# Patient Record
Sex: FEMALE | Race: WHITE
[De-identification: names, ages, dates, MRNs, and addresses within clinical notes are randomized per-mention and may not be internally consistent; named-entity substitution may affect disease eponyms.]

---

## 2017-12-10 ENCOUNTER — HOSPITAL ENCOUNTER (EMERGENCY)
Dept: HOSPITAL 76 - ED | Age: 37
Discharge: HOME | End: 2017-12-10
Payer: COMMERCIAL

## 2017-12-10 VITALS — SYSTOLIC BLOOD PRESSURE: 100 MMHG | DIASTOLIC BLOOD PRESSURE: 72 MMHG

## 2017-12-10 DIAGNOSIS — M79.605: Primary | ICD-10-CM

## 2017-12-10 DIAGNOSIS — R03.0: ICD-10-CM

## 2017-12-10 PROCEDURE — 99282 EMERGENCY DEPT VISIT SF MDM: CPT

## 2018-04-20 ENCOUNTER — HOSPITAL ENCOUNTER (OUTPATIENT)
Dept: HOSPITAL 76 - LAB.WCP | Age: 38
Discharge: HOME | End: 2018-04-20
Attending: PHYSICIAN ASSISTANT
Payer: COMMERCIAL

## 2018-04-20 DIAGNOSIS — L03.319: Primary | ICD-10-CM

## 2018-04-20 PROCEDURE — 87205 SMEAR GRAM STAIN: CPT

## 2018-04-20 PROCEDURE — 87070 CULTURE OTHR SPECIMN AEROBIC: CPT

## 2019-01-07 ENCOUNTER — HOSPITAL ENCOUNTER (OUTPATIENT)
Age: 39
End: 2019-01-07
Payer: COMMERCIAL

## 2019-01-07 DIAGNOSIS — Z12.31: Primary | ICD-10-CM

## 2019-01-07 DIAGNOSIS — Z53.9: ICD-10-CM

## 2019-01-11 ENCOUNTER — HOSPITAL ENCOUNTER (OUTPATIENT)
Age: 39
End: 2019-01-11
Payer: COMMERCIAL

## 2019-01-11 DIAGNOSIS — R92.8: Primary | ICD-10-CM

## 2019-01-11 DIAGNOSIS — N64.89: ICD-10-CM

## 2019-01-11 PROCEDURE — 76642 ULTRASOUND BREAST LIMITED: CPT

## 2019-01-11 PROCEDURE — 77066 DX MAMMO INCL CAD BI: CPT

## 2019-01-11 NOTE — DI.US.S_ITS
LIMITED ULTRASOUND OF RIGHT BREAST: 1/11/2019  
CLINICAL: Palpable tender right breast lump.    
   
Comparison is made to exams dated:  1/11/2019 mammogram, 12/14/2017 ultrasound, and   
12/14/2017 mammogram - [facility].    
Real-time and Doppler ultrasound of the right breast 10 o'clock region were performed.    
Gray scale images of the real-time examination were reviewed.    
   
There is a 0.6 x 0.2 x 0.5 cm oval circumscribed hypoechoic probable complicated cyst in   
the right breast at 10:00 position 15 cm from the nipple at the site of the patient's   
reported palpable abnormality with low level internal echogenic foci, mildly increased   
posterior acoustic enhancement/increased through transmission, and no vascularity on   
Doppler ultrasound. This may or may not correlate with the possible asymmetry seen on   
comparison diagnostic mammography.    
   
IMPRESSION: PROBABLY BENIGN   
0.6 cm probable complicated cyst in the right breast at 10:00 position 15 cm from the   
nipple at the site of the patient's reported palpable abnormality is probably benign, and   
may correlate with the possible asymmetry seen on diagnostic mammography.  A follow-up   
mammogram and an ultrasound in 6 months is recommended to demonstrate stability. The   
patient is advised to monitor her breasts and to return sooner for re-evaluation should   
she feel anything grow or change.    
   
This exam was interpreted at Station ID: [id number].    
Electronically Signed By: Tate Rodriguez M.D.    
ecl/:1/11/2019 12:46:21    
   
   
letter sent: Followup Recommended    
Ultrasound BI-RADS: 3 Probably benign

## 2019-01-11 NOTE — DI.MG.S_ITS
BILATERAL DIGITAL DIAGNOSTIC MAMMOGRAM 3D/2D: 1/11/2019  
CLINICAL: Right breast Lump.    
   
Comparison is made to exams dated:  12/14/2017 mammogram and 12/14/2017 [facility].  The tissue of both breasts is heterogeneously dense. This may lower the   
sensitivity of mammography.    
   
There is a triangular marker overlying the skin of the upper outer right breast at middle   
depth at the site of the patient's reported palpable abnormality.  There is a   
subcentimeter asymmetry seen on initial RMLO view underlying the triangular marker   
without convincing correlate on RCC view, which subsequently largely resolved on   
additional spot compression views and tomosynthesis images.    
   
There is an asymmetry in the lower inner left breast at middle depth which resolved with   
additional views and likely represented superimposed benign fibroglandular tissues.    
   
IMPRESSION: INCOMPLETE: NEEDS ADDITIONAL IMAGING EVALUATION  
Possible subcentimeter asymmetry at the site of the patient's reported focal palpable   
abnormality in the upper outer right breast at middle depth.  Targeted diagnostic   
ultrasound recommended for further evaluation, which will be performed immediately   
following this exam.    
   
This exam was interpreted at Station ID: [id number].    
   
NOTE: For mammograms, a report in lay terms will be sent to the patient. Approximately   
15% of breast malignancies will not be visualized mammographically. In the management of   
a palpable breast mass, a negative mammogram must not discourage biopsy of a clinically   
suspicious lesion.  
   
Electronically Signed By: Tate Rodriguez M.D.            
ecl/:1/11/2019 12:42:41    
   
   
letter sent: Additional Imaging Needed    
ACR BI-RADS Category 0: Incomplete [id number]

## 2019-06-07 ENCOUNTER — HOSPITAL ENCOUNTER (OUTPATIENT)
Age: 39
End: 2019-06-07
Payer: COMMERCIAL

## 2019-06-07 DIAGNOSIS — R92.8: Primary | ICD-10-CM

## 2019-06-07 DIAGNOSIS — N60.01: ICD-10-CM

## 2019-06-07 PROCEDURE — 77065 DX MAMMO INCL CAD UNI: CPT

## 2019-06-07 PROCEDURE — 76642 ULTRASOUND BREAST LIMITED: CPT

## 2019-06-07 NOTE — DI.MG.S_ITS
UNILATERAL RIGHT DIGITAL DIAGNOSTIC MAMMOGRAM 3D/2D SHORT-TERM FOLLOW-UP: 6/7/2019  
CLINICAL: Patient returns for a 6 month follow up of the right breast. With new palpable   
lump.    
   
Comparison is made to exams dated:  1/11/2019 ultrasound, 1/11/2019 mammogram, and   
12/14/2017 mammogram - .  The tissue of right breast is heterogeneously   
dense. This may lower the sensitivity of mammography.    
   
   
No significant masses, calcifications, or other findings are seen in the breast.    
   
IMPRESSION: INCOMPLETE: NEEDS ADDITIONAL IMAGING EVALUATION  
    
There is no abnormality seen in the right breast to correspond with the probably benignt   
ultrasound finding at 10 o'clock in the posterior depth in the upper outer quadrant   
evaluated on January 2019 examination, however, ultrasound is recommended which is   
scheduled to immediately follow this exam.   
   
There is no abnormality seen in the right breast to correspond with the area of clinical   
concern and palpable abnormality indicated by triangular marker in the posterior depth in   
the upper outer quadrant, however, ultrasound is recommended which is scheduled to   
immediately follow this exam.    
   
   
   
This exam was interpreted at Station ID: [id number].    
   
NOTE: For mammograms, a report in lay terms will be sent to the patient. Approximately   
15% of breast malignancies will not be visualized mammographically. In the management of   
a palpable breast mass, a negative mammogram must not discourage biopsy of a clinically   
suspicious lesion.  
   
Electronically Signed By: Elian Eduardo M.D.            
aty/:6/7/2019 12:03:46    
   
   
   
ACR BI-RADS Category 0: Incomplete [id number]

## 2019-08-07 NOTE — DI.US.S_ITS
ULTRASOUND OF RIGHT BREAST: 6/7/2019  
CLINICAL: 6 month follow-up of right breast. New palpable lump right breast.    
   
Comparison is made to exams dated:  6/7/2019 mammogram, 1/11/2019 ultrasound, 1/11/2019   
mammogram, 12/14/2017 ultrasound, and 12/14/2017 mammogram - [facility].    
Color flow and real-time ultrasound of the right breast were performed.  Gray scale   
images of the real-time examination were reviewed.    
   
   
There is a probable 0.9 cm x 0.2 cm x 0.3 cm wider than tall oval complicated cyst in the   
right breast at 10 o'clock posterior depth 15 cm from the nipple.  This oval complicated   
cyst is hypoechoic with mild posterior acoustic enhancement.  This abnormality is not   
significantly changed.  Color flow imaging demonstrates that there is no vascularity   
present.    
No abnormalities were seen sonographically in the right breast, specifically at the   
patient directed area of palpable concern in the posterior upper outer quadrant.    
   
IMPRESSION: PROBABLY BENIGN   
There is a stable 0.9 cm x 0.2 cm x 0.3 cm wider than tall oval probable complicated cyst   
in the right breast which likely represents a complicated cyst and is probably benign.    
   
There is no abnormality seen in the right breast to correspond with the area of clinical   
concern and palpable abnormality in the upper outer quadrant which likely represent   
normal fibroglandular tissue, however, clinical followup is recommended for persistent   
symptoms.    
   
A follow-up bilateral mammogram and a right breast ultrasound in 6 months is recommended   
to demonstrate stability.    
   
   
This exam was interpreted at Station ID: [id number].    
Electronically Signed By: Elian singh/:6/7/2019 12:14:20    
   
   
letter sent: Followup Recommended    
Ultrasound BI-RADS: 3 Probably benign
yes

## 2021-01-22 ENCOUNTER — HOSPITAL ENCOUNTER (OUTPATIENT)
Age: 41
End: 2021-01-22
Payer: COMMERCIAL

## 2021-01-22 DIAGNOSIS — Z12.31: Primary | ICD-10-CM

## 2021-01-22 PROCEDURE — 77063 BREAST TOMOSYNTHESIS BI: CPT

## 2021-01-22 PROCEDURE — 77067 SCR MAMMO BI INCL CAD: CPT

## 2021-01-22 NOTE — DI.MG.S_ITS
BILATERAL DIGITAL SCREENING MAMMOGRAM 3D/2D WITH CAD: 1/22/2021  
   
CLINICAL: Routine screening.    
   
Comparison is made to exams dated:  6/7/2019 mammogram, 1/11/2019 mammogram, and   
12/14/2017 mammogram - [facility].  The tissue of both breasts is heterogeneously   
dense. This may lower the sensitivity of mammography.    
   
Current study was also evaluated with a Computer Aided Detection (CAD) system.    
No significant masses, calcifications, or other findings are seen in either breast.    
There has been no significant interval change.  
   
IMPRESSION: NEGATIVE  
There is no mammographic evidence of malignancy. A 1 year screening mammogram is   
recommended.    
   
   
This exam was interpreted at Station ID: [id number].    
   
NOTE: For mammograms, a report in lay terms will be sent to the patient. Approximately   
15% of breast malignancies will not be visualized mammographically. In the management of   
a palpable breast mass, a negative mammogram must not discourage biopsy of a clinically   
suspicious lesion.  
   
Electronically Signed By: Mando le/moisés:1/22/2021 08:52:39    
   
   
letter sent: Normal Exam    
ACR BI-RADS Category 1: Negative [id number]

## 2021-07-01 ENCOUNTER — HOSPITAL ENCOUNTER (OUTPATIENT)
Dept: HOSPITAL 76 - LAB | Age: 41
Discharge: HOME | End: 2021-07-01
Attending: PHYSICIAN ASSISTANT
Payer: COMMERCIAL

## 2021-07-01 DIAGNOSIS — Z00.00: Primary | ICD-10-CM

## 2021-07-01 LAB
ALBUMIN DIAFP-MCNC: 4.2 G/DL (ref 3.2–5.5)
ALBUMIN/GLOB SERPL: 1.6 {RATIO} (ref 1–2.2)
ALP SERPL-CCNC: 43 IU/L (ref 42–121)
ALT SERPL W P-5'-P-CCNC: 20 IU/L (ref 10–60)
ANION GAP SERPL CALCULATED.4IONS-SCNC: 6 MMOL/L (ref 6–13)
AST SERPL W P-5'-P-CCNC: 20 IU/L (ref 10–42)
BASOPHILS NFR BLD AUTO: 0 10^3/UL (ref 0–0.1)
BASOPHILS NFR BLD AUTO: 0.5 %
BILIRUB BLD-MCNC: 0.7 MG/DL (ref 0.2–1)
BUN SERPL-MCNC: 16 MG/DL (ref 6–20)
CALCIUM UR-MCNC: 9.3 MG/DL (ref 8.5–10.3)
CHLORIDE SERPL-SCNC: 103 MMOL/L (ref 101–111)
CHOLEST SERPL-MCNC: 205 MG/DL
CO2 SERPL-SCNC: 28 MMOL/L (ref 21–32)
CREAT SERPLBLD-SCNC: 0.7 MG/DL (ref 0.4–1)
EOSINOPHIL # BLD AUTO: 0.1 10^3/UL (ref 0–0.7)
EOSINOPHIL NFR BLD AUTO: 3.3 %
ERYTHROCYTE [DISTWIDTH] IN BLOOD BY AUTOMATED COUNT: 13.2 % (ref 12–15)
GFRSERPLBLD MDRD-ARVRAT: 92 ML/MIN/{1.73_M2} (ref 89–?)
GLOBULIN SER-MCNC: 2.7 G/DL (ref 2.1–4.2)
GLUCOSE SERPL-MCNC: 102 MG/DL (ref 70–100)
HCT VFR BLD AUTO: 38 % (ref 37–47)
HDLC SERPL-MCNC: 64 MG/DL
HDLC SERPL: 3.2 {RATIO} (ref ?–4.4)
HGB UR QL STRIP: 12.7 G/DL (ref 12–16)
LDLC SERPL CALC-MCNC: 128 MG/DL
LDLC/HDLC SERPL: 2 {RATIO} (ref ?–4.4)
LYMPHOCYTES # SPEC AUTO: 1.5 10^3/UL (ref 1.5–3.5)
LYMPHOCYTES NFR BLD AUTO: 34.7 %
MCH RBC QN AUTO: 30.9 PG (ref 27–31)
MCHC RBC AUTO-ENTMCNC: 33.4 G/DL (ref 32–36)
MCV RBC AUTO: 92.5 FL (ref 81–99)
MONOCYTES # BLD AUTO: 0.3 10^3/UL (ref 0–1)
MONOCYTES NFR BLD AUTO: 7.5 %
NEUTROPHILS # BLD AUTO: 2.3 10^3/UL (ref 1.5–6.6)
NEUTROPHILS # SNV AUTO: 4.3 X10^3/UL (ref 4.8–10.8)
NEUTROPHILS NFR BLD AUTO: 53.8 %
NRBC # BLD AUTO: 0 /100WBC
NRBC # BLD AUTO: 0 X10^3/UL
PDW BLD AUTO: 8.7 FL (ref 7.9–10.8)
PLATELET # BLD: 188 10^3/UL (ref 130–450)
POTASSIUM SERPL-SCNC: 4.3 MMOL/L (ref 3.5–5)
PROT SPEC-MCNC: 6.9 G/DL (ref 6.7–8.2)
RBC MAR: 4.11 10^6/UL (ref 4.2–5.4)
SODIUM SERPLBLD-SCNC: 137 MMOL/L (ref 135–145)
TRIGL P FAST SERPL-MCNC: 64 MG/DL
TSH SERPL-ACNC: 4.13 UIU/ML (ref 0.34–5.6)
VLDLC SERPL-SCNC: 13 MG/DL

## 2021-07-01 PROCEDURE — 36415 COLL VENOUS BLD VENIPUNCTURE: CPT

## 2021-07-01 PROCEDURE — 85025 COMPLETE CBC W/AUTO DIFF WBC: CPT

## 2021-07-01 PROCEDURE — 84443 ASSAY THYROID STIM HORMONE: CPT

## 2021-07-01 PROCEDURE — 80053 COMPREHEN METABOLIC PANEL: CPT

## 2021-07-01 PROCEDURE — 80061 LIPID PANEL: CPT

## 2021-07-01 PROCEDURE — 83721 ASSAY OF BLOOD LIPOPROTEIN: CPT

## 2022-01-18 ENCOUNTER — HOSPITAL ENCOUNTER (OUTPATIENT)
Age: 42
End: 2022-01-18
Payer: COMMERCIAL

## 2022-01-18 DIAGNOSIS — N85.2: ICD-10-CM

## 2022-01-18 DIAGNOSIS — R14.0: Primary | ICD-10-CM

## 2022-01-18 DIAGNOSIS — R10.2: ICD-10-CM

## 2022-01-18 DIAGNOSIS — N83.201: ICD-10-CM

## 2022-01-18 PROCEDURE — 76856 US EXAM PELVIC COMPLETE: CPT

## 2022-01-18 PROCEDURE — 76830 TRANSVAGINAL US NON-OB: CPT

## 2022-03-07 ENCOUNTER — HOSPITAL ENCOUNTER (OUTPATIENT)
Age: 42
End: 2022-03-07
Payer: COMMERCIAL

## 2022-03-07 DIAGNOSIS — Z12.31: Primary | ICD-10-CM

## 2022-03-07 PROCEDURE — 77063 BREAST TOMOSYNTHESIS BI: CPT

## 2022-03-07 PROCEDURE — 77067 SCR MAMMO BI INCL CAD: CPT

## 2022-03-28 ENCOUNTER — HOSPITAL ENCOUNTER (EMERGENCY)
Age: 42
Discharge: HOME | End: 2022-03-28
Payer: COMMERCIAL

## 2022-03-28 VITALS
DIASTOLIC BLOOD PRESSURE: 63 MMHG | RESPIRATION RATE: 12 BRPM | SYSTOLIC BLOOD PRESSURE: 131 MMHG | OXYGEN SATURATION: 100 % | HEART RATE: 51 BPM

## 2022-03-28 VITALS
DIASTOLIC BLOOD PRESSURE: 70 MMHG | HEART RATE: 64 BPM | TEMPERATURE: 98.78 F | SYSTOLIC BLOOD PRESSURE: 159 MMHG | OXYGEN SATURATION: 100 % | RESPIRATION RATE: 20 BRPM

## 2022-03-28 VITALS — BODY MASS INDEX: 33.6 KG/M2

## 2022-03-28 DIAGNOSIS — R51.9: Primary | ICD-10-CM

## 2022-03-28 LAB
BASOPHILS NFR SPEC MANUAL: 1 % (ref 0–1)
BUN SERPL-MCNC: 13 MG/DL (ref 7–17)
CALCIUM SERPL-MCNC: 9.6 MG/DL (ref 8.4–10.2)
CHLORIDE SERPL-SCNC: 106 MMOL/L (ref 98–107)
CO2 SERPL-SCNC: 28 MMOL/L (ref 22–32)
EOSINOPHILS PERCENT MANUAL: 2 % (ref 2–4)
ESTIMATED GLOMERULAR FILT RATE: > 60 ML/MIN (ref 60–?)
GLUCOSE SERPL-MCNC: 86 MG/DL (ref 70–100)
HEMATOCRIT: 37.8 % (ref 36–46)
HEMOGLOBIN: 12.9 G/DL (ref 12–16)
HEMOLYSIS: < 15 (ref 0–50)
LYMPHOCYTES PERCENT MANUAL: 30 % (ref 25–45)
MCV RBC: 88.8 FL (ref 80–100)
MEAN CORPUSCULAR HEMOGLOBIN: 30.4 PG (ref 26–34)
MEAN CORPUSCULAR HGB CONC: 34.2 % (ref 30–36)
MONOCYTES PERCENT MANUAL: 7 % (ref 2–11)
NEUTROPHILS ABSOLUTE MANUAL: 3540 /UL (ref 3000–5900)
NEUTS SEG NFR BLD: 60 % (ref 38–70)
PLATELET COUNT: 220 X10^3/UL (ref 150–400)
POTASSIUM SERPL-SCNC: 3.7 MMOL/L (ref 3.4–5.1)
SODIUM SERPL-SCNC: 137 MMOL/L (ref 137–145)
TOTAL CELLS COUNTED: 100

## 2022-03-28 PROCEDURE — 99284 EMERGENCY DEPT VISIT MOD MDM: CPT

## 2022-03-28 PROCEDURE — 96375 TX/PRO/DX INJ NEW DRUG ADDON: CPT

## 2022-03-28 PROCEDURE — 96374 THER/PROPH/DIAG INJ IV PUSH: CPT

## 2022-03-28 PROCEDURE — 36415 COLL VENOUS BLD VENIPUNCTURE: CPT

## 2022-03-28 PROCEDURE — 70496 CT ANGIOGRAPHY HEAD: CPT

## 2022-03-28 PROCEDURE — 84703 CHORIONIC GONADOTROPIN ASSAY: CPT

## 2022-03-28 PROCEDURE — 85025 COMPLETE CBC W/AUTO DIFF WBC: CPT

## 2022-03-28 PROCEDURE — 80048 BASIC METABOLIC PNL TOTAL CA: CPT

## 2022-03-28 PROCEDURE — 96361 HYDRATE IV INFUSION ADD-ON: CPT

## 2022-03-28 PROCEDURE — 70498 CT ANGIOGRAPHY NECK: CPT

## 2022-04-07 ENCOUNTER — HOSPITAL ENCOUNTER (OUTPATIENT)
Age: 42
End: 2022-04-07
Payer: COMMERCIAL

## 2022-04-07 DIAGNOSIS — Z01.812: Primary | ICD-10-CM

## 2022-04-07 DIAGNOSIS — Z20.822: ICD-10-CM

## 2022-04-07 PROCEDURE — 87635 SARS-COV-2 COVID-19 AMP PRB: CPT

## 2022-04-08 ENCOUNTER — HOSPITAL ENCOUNTER (OUTPATIENT)
Age: 42
Discharge: HOME | End: 2022-04-08
Payer: COMMERCIAL

## 2022-04-08 VITALS
SYSTOLIC BLOOD PRESSURE: 118 MMHG | OXYGEN SATURATION: 100 % | HEART RATE: 51 BPM | DIASTOLIC BLOOD PRESSURE: 65 MMHG | RESPIRATION RATE: 16 BRPM

## 2022-04-08 VITALS
DIASTOLIC BLOOD PRESSURE: 64 MMHG | SYSTOLIC BLOOD PRESSURE: 137 MMHG | HEART RATE: 52 BPM | TEMPERATURE: 97.8 F | RESPIRATION RATE: 16 BRPM | OXYGEN SATURATION: 98 %

## 2022-04-08 VITALS
RESPIRATION RATE: 16 BRPM | HEART RATE: 49 BPM | SYSTOLIC BLOOD PRESSURE: 117 MMHG | OXYGEN SATURATION: 100 % | DIASTOLIC BLOOD PRESSURE: 55 MMHG

## 2022-04-08 VITALS
HEART RATE: 53 BPM | SYSTOLIC BLOOD PRESSURE: 123 MMHG | TEMPERATURE: 99.86 F | OXYGEN SATURATION: 99 % | DIASTOLIC BLOOD PRESSURE: 78 MMHG | RESPIRATION RATE: 16 BRPM

## 2022-04-08 VITALS
HEART RATE: 50 BPM | SYSTOLIC BLOOD PRESSURE: 129 MMHG | DIASTOLIC BLOOD PRESSURE: 66 MMHG | RESPIRATION RATE: 16 BRPM | OXYGEN SATURATION: 98 %

## 2022-04-08 VITALS
OXYGEN SATURATION: 98 % | DIASTOLIC BLOOD PRESSURE: 70 MMHG | RESPIRATION RATE: 16 BRPM | SYSTOLIC BLOOD PRESSURE: 118 MMHG | HEART RATE: 58 BPM

## 2022-04-08 VITALS — BODY MASS INDEX: 33.6 KG/M2

## 2022-04-08 DIAGNOSIS — L90.0: Primary | ICD-10-CM

## 2022-04-08 DIAGNOSIS — K62.1: ICD-10-CM

## 2022-04-08 PROCEDURE — 0DJD8ZZ INSPECTION OF LOWER INTESTINAL TRACT, VIA NATURAL OR ARTIFICIAL OPENING ENDOSCOPIC: ICD-10-PCS | Performed by: SURGERY

## 2022-04-08 PROCEDURE — 99152 MOD SED SAME PHYS/QHP 5/>YRS: CPT

## 2022-04-08 PROCEDURE — 45380 COLONOSCOPY AND BIOPSY: CPT

## 2023-09-28 ENCOUNTER — HOSPITAL ENCOUNTER (OUTPATIENT)
Age: 43
End: 2023-09-28
Payer: COMMERCIAL

## 2023-09-28 DIAGNOSIS — Z12.31: Primary | ICD-10-CM

## 2023-09-28 PROCEDURE — 77067 SCR MAMMO BI INCL CAD: CPT

## 2023-09-28 PROCEDURE — 77063 BREAST TOMOSYNTHESIS BI: CPT

## 2024-04-02 ENCOUNTER — HOSPITAL ENCOUNTER (OUTPATIENT)
Dept: HOSPITAL 76 - LAB | Age: 44
Discharge: HOME | End: 2024-04-02
Attending: PHYSICIAN ASSISTANT
Payer: COMMERCIAL

## 2024-04-02 DIAGNOSIS — Z00.00: Primary | ICD-10-CM

## 2024-04-02 LAB
ALBUMIN DIAFP-MCNC: 4 G/DL (ref 3.2–5.5)
ALBUMIN/GLOB SERPL: 1.7 {RATIO} (ref 1–2.2)
ALP SERPL-CCNC: 54 IU/L (ref 42–121)
ALT SERPL W P-5'-P-CCNC: 12 IU/L (ref 10–60)
ANION GAP SERPL CALCULATED.4IONS-SCNC: 3 MMOL/L (ref 6–13)
AST SERPL W P-5'-P-CCNC: 16 IU/L (ref 10–42)
BASOPHILS NFR BLD AUTO: 0 10^3/UL (ref 0–0.1)
BASOPHILS NFR BLD AUTO: 0.7 %
BILIRUB BLD-MCNC: 0.4 MG/DL (ref 0.2–1)
BUN SERPL-MCNC: 12 MG/DL (ref 6–20)
CALCIUM UR-MCNC: 9.8 MG/DL (ref 8.5–10.3)
CHLORIDE SERPL-SCNC: 106 MMOL/L (ref 101–111)
CHOLEST SERPL-MCNC: 170 MG/DL
CO2 SERPL-SCNC: 32 MMOL/L (ref 21–32)
CREAT SERPLBLD-SCNC: 0.7 MG/DL (ref 0.6–1.3)
EOSINOPHIL # BLD AUTO: 0.1 10^3/UL (ref 0–0.7)
EOSINOPHIL NFR BLD AUTO: 3.3 %
ERYTHROCYTE [DISTWIDTH] IN BLOOD BY AUTOMATED COUNT: 13.4 % (ref 12–15)
GFRSERPLBLD MDRD-ARVRAT: 90 ML/MIN/{1.73_M2} (ref 89–?)
GLOBULIN SER-MCNC: 2.3 G/DL (ref 2.1–4.2)
GLUCOSE SERPL-MCNC: 90 MG/DL (ref 74–104)
HCT VFR BLD AUTO: 36.6 % (ref 37–47)
HDLC SERPL-MCNC: 52 MG/DL
HDLC SERPL: 3.3 {RATIO} (ref ?–4.4)
HGB UR QL STRIP: 11.6 G/DL (ref 12–16)
LDLC SERPL CALC-MCNC: 99 MG/DL
LDLC/HDLC SERPL: 1.9 {RATIO} (ref ?–4.4)
LYMPHOCYTES # SPEC AUTO: 1.3 10^3/UL (ref 1.5–3.5)
LYMPHOCYTES NFR BLD AUTO: 30.3 %
MCH RBC QN AUTO: 28.8 PG (ref 27–31)
MCHC RBC AUTO-ENTMCNC: 31.7 G/DL (ref 32–36)
MCV RBC AUTO: 90.8 FL (ref 81–99)
MONOCYTES # BLD AUTO: 0.3 10^3/UL (ref 0–1)
MONOCYTES NFR BLD AUTO: 7.3 %
NEUTROPHILS # BLD AUTO: 2.5 10^3/UL (ref 1.5–6.6)
NEUTROPHILS # SNV AUTO: 4.2 X10^3/UL (ref 4.8–10.8)
NEUTROPHILS NFR BLD AUTO: 58.2 %
NRBC # BLD AUTO: 0 /100WBC
NRBC # BLD AUTO: 0 X10^3/UL
PDW BLD AUTO: 8.9 FL (ref 7.9–10.8)
PLATELET # BLD: 226 10^3/UL (ref 130–450)
POTASSIUM SERPL-SCNC: 4.5 MMOL/L (ref 3.5–4.5)
PROT SPEC-MCNC: 6.3 G/DL (ref 6.4–8.9)
RBC MAR: 4.03 10^6/UL (ref 4.2–5.4)
SODIUM SERPLBLD-SCNC: 141 MMOL/L (ref 135–145)
TRIGL P FAST SERPL-MCNC: 96 MG/DL (ref 48–352)
TSH SERPL-ACNC: 3.63 UIU/ML (ref 0.34–5.6)
VLDLC SERPL-SCNC: 19 MG/DL

## 2024-04-02 PROCEDURE — 80061 LIPID PANEL: CPT

## 2024-04-02 PROCEDURE — 80053 COMPREHEN METABOLIC PANEL: CPT

## 2024-04-02 PROCEDURE — 85025 COMPLETE CBC W/AUTO DIFF WBC: CPT

## 2024-04-02 PROCEDURE — 36415 COLL VENOUS BLD VENIPUNCTURE: CPT

## 2024-04-02 PROCEDURE — 84443 ASSAY THYROID STIM HORMONE: CPT

## 2024-04-02 PROCEDURE — 83721 ASSAY OF BLOOD LIPOPROTEIN: CPT

## 2024-04-18 ENCOUNTER — HOSPITAL ENCOUNTER (OUTPATIENT)
Dept: HOSPITAL 76 - DI | Age: 44
Discharge: HOME | End: 2024-04-18
Attending: PHYSICIAN ASSISTANT
Payer: COMMERCIAL

## 2024-04-18 DIAGNOSIS — M79.605: Primary | ICD-10-CM

## 2024-11-23 ENCOUNTER — HOSPITAL ENCOUNTER (OUTPATIENT)
Dept: HOSPITAL 73 - MAMMO | Age: 44
End: 2024-11-23
Payer: COMMERCIAL

## 2024-11-23 DIAGNOSIS — R92.333: ICD-10-CM

## 2024-11-23 DIAGNOSIS — Z12.31: Primary | ICD-10-CM

## 2024-11-23 PROCEDURE — 77067 SCR MAMMO BI INCL CAD: CPT

## 2024-11-23 PROCEDURE — 77063 BREAST TOMOSYNTHESIS BI: CPT
